# Patient Record
Sex: FEMALE | Race: ASIAN | NOT HISPANIC OR LATINO | URBAN - METROPOLITAN AREA
[De-identification: names, ages, dates, MRNs, and addresses within clinical notes are randomized per-mention and may not be internally consistent; named-entity substitution may affect disease eponyms.]

---

## 2021-05-13 ENCOUNTER — OFFICE VISIT (OUTPATIENT)
Dept: URBAN - METROPOLITAN AREA CLINIC 35 | Facility: CLINIC | Age: 41
End: 2021-05-13

## 2021-05-13 VITALS
BODY MASS INDEX: 21.17 KG/M2 | SYSTOLIC BLOOD PRESSURE: 112 MMHG | HEIGHT: 64 IN | HEART RATE: 86 BPM | TEMPERATURE: 99 F | OXYGEN SATURATION: 99 % | DIASTOLIC BLOOD PRESSURE: 72 MMHG | WEIGHT: 124 LBS

## 2021-05-13 PROBLEM — 102614006 GENERALIZED ABDOMINAL PAIN: Status: ACTIVE | Noted: 2021-05-13

## 2021-05-13 PROBLEM — 79922009 EPIGASTRIC PAIN: Status: ACTIVE | Noted: 2021-05-13

## 2021-05-13 PROBLEM — 16331000 HEARTBURN: Status: ACTIVE | Noted: 2021-05-13

## 2021-05-13 PROBLEM — 61565001 CHOLESTEROLOSIS OF GALLBLADDER: Status: ACTIVE | Noted: 2021-05-13

## 2021-05-13 RX ORDER — LORATADINE 10 MG
1 PACKET MIXED WITH 8 OUNCES OF FLUID TABLET,DISINTEGRATING ORAL ONCE A DAY
Qty: 30 | Status: ACTIVE | COMMUNITY

## 2021-05-13 RX ORDER — OMEPRAZOLE 40 MG/1
1 CAPSULE 30 MINUTES BEFORE MORNING MEAL CAPSULE, DELAYED RELEASE ORAL ONCE A DAY
Qty: 30 | Refills: 2 | OUTPATIENT
Start: 2021-05-13

## 2021-05-13 RX ORDER — LEVOTHYROXINE SODIUM 0.07 MG/1
1 TABLET IN THE MORNING ON AN EMPTY STOMACH TABLET ORAL ONCE A DAY
Qty: 30 | Status: ACTIVE | COMMUNITY

## 2021-05-13 RX ORDER — DICYCLOMINE HYDROCHLORIDE 10 MG/1
1 TABLET AS NEEDED CAPSULE ORAL THREE TIMES A DAY
Qty: 30 TABLET | Refills: 2 | OUTPATIENT
Start: 2021-05-13

## 2021-05-13 RX ORDER — CHOLECALCIFEROL (VITAMIN D3) 50 MCG
1 TABLET TABLET ORAL ONCE A DAY
Qty: 30 | Status: ACTIVE | COMMUNITY

## 2021-05-13 RX ORDER — BISACODYL 5 MG
1 TABLET AS NEEDED TABLET, DELAYED RELEASE (ENTERIC COATED) ORAL ONCE A DAY
Qty: 30 | Status: ACTIVE | COMMUNITY

## 2021-05-13 RX ORDER — OMEPRAZOLE 20 MG/1
1 CAPSULE CAPSULE, DELAYED RELEASE ORAL ONCE A DAY
Qty: 90 CAPSULE | Status: ACTIVE | COMMUNITY
Start: 2021-05-13

## 2021-05-13 NOTE — EXAM-MIGRATED EXAMINATIONS
General Examination : Medical assistant was in room.;     GENERAL APPEARANCE: - alert, in no acute distress, well developed, well nourished;   ORAL CAVITY: - mucosa moist;   THROAT: - clear;   NECK/THYROID: - neck supple, full range of motion, no cervical lymphadenopathy, no thyroid nodules, no thyromegaly, trachea midline;   HEART: - no murmurs, regular rate and rhythm, S1, S2 normal;   LUNGS: - clear to auscultation bilaterally, good air movement, no wheezes, rales, rhonchi;   ABDOMEN: - bowel sounds present, no masses palpable, no organomegaly , no rebound tenderness, soft, nontender, nondistended;

## 2021-05-13 NOTE — HPI-MIGRATED HPI
Abdominal Pain :                   40 year old female patient presents for abdominal pain consult. Patient admits she has been experiencing symptoms since 3 weeks ago. She has seen Dr roberto wick within the past 3 weeks . He ordered HIDA scan and RUQ Abd ultrasound with normal results.   Patient describes symptoms as RUQ and epigastric pain. Patient admits symptoms are intermittent throughout the day.   Patient denies nausea or vomiting.  Patient denies EGD in the past.    ;

## 2021-06-03 ENCOUNTER — OFFICE VISIT (OUTPATIENT)
Dept: URBAN - METROPOLITAN AREA CLINIC 35 | Facility: CLINIC | Age: 41
End: 2021-06-03

## 2021-06-03 VITALS
HEART RATE: 96 BPM | BODY MASS INDEX: 21.17 KG/M2 | HEIGHT: 64 IN | WEIGHT: 124 LBS | SYSTOLIC BLOOD PRESSURE: 120 MMHG | DIASTOLIC BLOOD PRESSURE: 50 MMHG | OXYGEN SATURATION: 98 %

## 2021-06-03 RX ORDER — OMEPRAZOLE 20 MG/1
1 CAPSULE CAPSULE, DELAYED RELEASE ORAL ONCE A DAY
Qty: 90 CAPSULE | Status: ACTIVE | COMMUNITY
Start: 2021-05-13

## 2021-06-03 RX ORDER — LEVOTHYROXINE SODIUM 0.07 MG/1
1 TABLET IN THE MORNING ON AN EMPTY STOMACH TABLET ORAL ONCE A DAY
Qty: 30 | Status: ACTIVE | COMMUNITY

## 2021-06-03 RX ORDER — BISACODYL 5 MG
1 TABLET AS NEEDED TABLET, DELAYED RELEASE (ENTERIC COATED) ORAL ONCE A DAY
Qty: 30 | Status: ACTIVE | COMMUNITY

## 2021-06-03 RX ORDER — DICYCLOMINE HYDROCHLORIDE 10 MG/1
1 TABLET AS NEEDED CAPSULE ORAL THREE TIMES A DAY
Qty: 30 TABLET | Refills: 2 | Status: ACTIVE | COMMUNITY
Start: 2021-05-13

## 2021-06-03 RX ORDER — OMEPRAZOLE 40 MG/1
1 CAPSULE 30 MINUTES BEFORE MORNING MEAL CAPSULE, DELAYED RELEASE ORAL ONCE A DAY
Qty: 30 | Refills: 2 | Status: ACTIVE | COMMUNITY
Start: 2021-05-13

## 2021-06-03 RX ORDER — LORATADINE 10 MG
1 PACKET MIXED WITH 8 OUNCES OF FLUID TABLET,DISINTEGRATING ORAL ONCE A DAY
Qty: 30 | Status: ACTIVE | COMMUNITY

## 2021-06-03 RX ORDER — OMEPRAZOLE 20 MG/1
1 CAPSULE 30 MIN BEFORE MEAL CAPSULE, DELAYED RELEASE ORAL BID
Qty: 60 | Refills: 2 | OUTPATIENT
Start: 2021-06-03

## 2021-06-03 RX ORDER — CHOLECALCIFEROL (VITAMIN D3) 50 MCG
1 TABLET TABLET ORAL ONCE A DAY
Qty: 30 | Status: ACTIVE | COMMUNITY

## 2021-06-03 NOTE — EXAM-MIGRATED EXAMINATIONS
GENERAL APPEARANCE: - alert, in no acute distress, well developed, well nourished;   ORAL CAVITY: - mucosa moist;   THROAT: - clear;   NECK/THYROID: - neck supple, full range of motion, no cervical lymphadenopathy, no thyroid nodules, no thyromegaly, trachea midline;   HEART: - no murmurs, regular rate and rhythm, S1, S2 normal;   LUNGS: - clear to auscultation bilaterally, good air movement, no wheezes, rales, rhonchi;

## 2021-06-03 NOTE — HPI-MIGRATED HPI
Heartburn : 41 y/o female patient presents today to follow up of heartburn symptoms. Patient continues to experience heartburn and epigastric pain. She denies taking Omeprazole 40 mg any longer as she felt more heartburn.  Patient states that she has been taking Omeprazole 20 mg OTC with little relief. Patient also notes that she is no longer taking Dicyclomine 10 mg since she felt that it was not effective at all.   Patient denies nausea , vomiting, or changes in bowel habits.  ;   Abdominal Pain : 40 year-old female patient presents today for a 2 week follow up of epigastric pain. Patient denies improvement since last visit. She denies taking Omeprazole 40 mg any longer as she felt more heartburn.  Patient states that she has been taking Omeprazole 20 mg OTC with little relief. Patient also notes that she is no longer taking Dicyclomine 10 mg since she felt that it was not effective at all.   Patient denies nausea , vomiting, or changes in bowel habits.  Patient notes that stretching gives minor relief of symptoms.          Of last visit (05/13/2021) 40 year old female patient presents for abdominal pain consult. Patient admits she has been experiencing symptoms since 3 weeks ago. She has seen Dr roberto wick within the past 3 weeks . He ordered HIDA scan and RUQ Abd ultrasound with normal results.   Patient describes symptoms as RUQ and epigastric pain. Patient admits symptoms are intermittent throughout the day.   Patient denies nausea or vomiting.  Patient denies EGD in the past.;

## 2021-06-08 ENCOUNTER — OFFICE VISIT (OUTPATIENT)
Dept: URBAN - METROPOLITAN AREA SURGERY CENTER 8 | Facility: SURGERY CENTER | Age: 41
End: 2021-06-08

## 2021-06-24 ENCOUNTER — OFFICE VISIT (OUTPATIENT)
Dept: URBAN - METROPOLITAN AREA CLINIC 35 | Facility: CLINIC | Age: 41
End: 2021-06-24

## 2021-06-24 ENCOUNTER — DASHBOARD ENCOUNTERS (OUTPATIENT)
Age: 41
End: 2021-06-24

## 2021-06-24 VITALS
HEIGHT: 64 IN | BODY MASS INDEX: 21 KG/M2 | OXYGEN SATURATION: 99 % | SYSTOLIC BLOOD PRESSURE: 100 MMHG | HEART RATE: 97 BPM | WEIGHT: 123 LBS | DIASTOLIC BLOOD PRESSURE: 74 MMHG

## 2021-06-24 RX ORDER — LORATADINE 10 MG
1 PACKET MIXED WITH 8 OUNCES OF FLUID TABLET,DISINTEGRATING ORAL ONCE A DAY
Qty: 30 | Status: ACTIVE | COMMUNITY

## 2021-06-24 RX ORDER — OMEPRAZOLE 40 MG/1
1 CAPSULE 30 MINUTES BEFORE MORNING MEAL CAPSULE, DELAYED RELEASE ORAL ONCE A DAY
Qty: 30 | Refills: 2 | Status: DISCONTINUED | COMMUNITY
Start: 2021-05-13

## 2021-06-24 RX ORDER — BISACODYL 5 MG
1 TABLET AS NEEDED TABLET, DELAYED RELEASE (ENTERIC COATED) ORAL ONCE A DAY
Qty: 30 | Status: ACTIVE | COMMUNITY

## 2021-06-24 RX ORDER — OMEPRAZOLE 20 MG/1
1 CAPSULE 30 MIN BEFORE MEAL CAPSULE, DELAYED RELEASE ORAL BID
Qty: 60 | Refills: 2 | Status: ACTIVE | COMMUNITY
Start: 2021-06-03

## 2021-06-24 RX ORDER — DICYCLOMINE HYDROCHLORIDE 10 MG/1
1 TABLET AS NEEDED CAPSULE ORAL THREE TIMES A DAY
Qty: 30 TABLET | Refills: 2 | Status: ACTIVE | COMMUNITY
Start: 2021-05-13

## 2021-06-24 RX ORDER — CHOLECALCIFEROL (VITAMIN D3) 50 MCG
1 TABLET TABLET ORAL ONCE A DAY
Qty: 30 | Status: ACTIVE | COMMUNITY

## 2021-06-24 RX ORDER — LEVOTHYROXINE SODIUM 0.07 MG/1
1 TABLET IN THE MORNING ON AN EMPTY STOMACH TABLET ORAL ONCE A DAY
Qty: 30 | Status: ACTIVE | COMMUNITY

## 2021-06-24 RX ORDER — OMEPRAZOLE 20 MG/1
1 CAPSULE CAPSULE, DELAYED RELEASE ORAL ONCE A DAY
Qty: 90 CAPSULE | Status: DISCONTINUED | COMMUNITY
Start: 2021-05-13

## 2021-06-24 NOTE — HPI-MIGRATED HPI
Heartburn : Patient admits heartburn is con trolled with Omeprazole 20mg daily . She denies nausea or vomiting    Of last visit (06/03/2021) 41 y/o female patient presents today to follow up of heartburn symptoms. Patient continues to experience heartburn and epigastric pain. She denies taking Omeprazole 40 mg any longer as she felt more heartburn.  Patient states that she has been taking Omeprazole 20 mg OTC with little relief. Patient also notes that she is no longer taking Dicyclomine 10 mg since she felt that it was not effective at all.   Patient denies nausea , vomiting, or changes in bowel habits.;   Abdominal Pain : Patient presents for follow up after EGD . Patient admits she is still experiencing RUQ pain. She admitstaking dicyclomine as needed . She denies complications after procedure .     Of last visit (06/03/2021) 40 year-old female patient presents today for a 2 week follow up of epigastric pain. Patient denies improvement since last visit. She denies taking Omeprazole 40 mg any longer as she felt more heartburn.  Patient states that she has been taking Omeprazole 20 mg OTC with little relief. Patient also notes that she is no longer taking Dicyclomine 10 mg since she felt that it was not effective at all.   Patient denies nausea , vomiting, or changes in bowel habits.  Patient notes that stretching gives minor relief of symptoms.          Of last visit (05/13/2021) 40 year old female patient presents for abdominal pain consult. Patient admits she has been experiencing symptoms since 3 weeks ago. She has seen Dr roberto wick within the past 3 weeks . He ordered HIDA scan and RUQ Abd ultrasound with normal results.   Patient describes symptoms as RUQ and epigastric pain. Patient admits symptoms are intermittent throughout the day.   Patient denies nausea or vomiting.  Patient denies EGD in the past.;

## 2025-04-04 ENCOUNTER — OFFICE VISIT (OUTPATIENT)
Dept: URBAN - METROPOLITAN AREA CLINIC 35 | Facility: CLINIC | Age: 45
End: 2025-04-04
Payer: COMMERCIAL

## 2025-04-04 ENCOUNTER — OFFICE VISIT (OUTPATIENT)
Dept: URBAN - METROPOLITAN AREA CLINIC 35 | Facility: CLINIC | Age: 45
End: 2025-04-04

## 2025-04-04 DIAGNOSIS — R19.4 CHANGE IN BOWEL HABITS: ICD-10-CM

## 2025-04-04 DIAGNOSIS — K82.4 CHOLESTEROLOSIS OF GALLBLADDER: ICD-10-CM

## 2025-04-04 DIAGNOSIS — R10.30 LOWER ABDOMINAL PAIN: ICD-10-CM

## 2025-04-04 DIAGNOSIS — K58.1 IRRITABLE BOWEL SYNDROME WITH CONSTIPATION: ICD-10-CM

## 2025-04-04 DIAGNOSIS — K21.9 CHRONIC GERD: ICD-10-CM

## 2025-04-04 PROBLEM — 235595009: Status: ACTIVE | Noted: 2025-04-04

## 2025-04-04 PROBLEM — 54586004: Status: ACTIVE | Noted: 2025-04-04

## 2025-04-04 PROBLEM — 440630006: Status: ACTIVE | Noted: 2025-04-04

## 2025-04-04 PROBLEM — 88111009: Status: ACTIVE | Noted: 2025-04-04

## 2025-04-04 PROCEDURE — 99214 OFFICE O/P EST MOD 30 MIN: CPT | Performed by: INTERNAL MEDICINE

## 2025-04-04 RX ORDER — LORATADINE 10 MG
1 PACKET MIXED WITH 8 OUNCES OF FLUID TABLET,DISINTEGRATING ORAL ONCE A DAY
Qty: 30 | Status: ON HOLD | COMMUNITY

## 2025-04-04 RX ORDER — TENAPANOR HYDROCHLORIDE 53.2 MG/1
1 TABLET IMMEDIATELY BEFORE MEALS TABLET ORAL TWICE A DAY
Qty: 60 | Refills: 3 | OUTPATIENT
Start: 2025-04-04

## 2025-04-04 RX ORDER — BISACODYL 5 MG
1 TABLET AS NEEDED TABLET, DELAYED RELEASE (ENTERIC COATED) ORAL ONCE A DAY
Qty: 30 | Status: ACTIVE | COMMUNITY

## 2025-04-04 RX ORDER — OMEPRAZOLE 20 MG/1
1 CAPSULE 30 MIN BEFORE MEAL CAPSULE, DELAYED RELEASE ORAL BID
Qty: 60 | Refills: 2 | Status: ON HOLD | COMMUNITY
Start: 2021-06-03

## 2025-04-04 RX ORDER — OMEPRAZOLE 20 MG/1
1 TABLET 1/2 TO 1 HOUR BEFORE MORNING MEAL TABLET, DELAYED RELEASE ORAL ONCE A DAY
Qty: 30 | Refills: 3 | OUTPATIENT
Start: 2025-04-04

## 2025-04-04 RX ORDER — DICYCLOMINE HYDROCHLORIDE 10 MG/1
1 TABLET AS NEEDED CAPSULE ORAL THREE TIMES A DAY
Qty: 30 TABLET | Refills: 2 | Status: ON HOLD | COMMUNITY
Start: 2021-05-13

## 2025-04-04 RX ORDER — CHOLECALCIFEROL (VITAMIN D3) 50 MCG
1 TABLET TABLET ORAL ONCE A DAY
Qty: 30 | Status: ACTIVE | COMMUNITY

## 2025-04-04 RX ORDER — LEVOTHYROXINE SODIUM 0.07 MG/1
1 TABLET IN THE MORNING ON AN EMPTY STOMACH TABLET ORAL ONCE A DAY
Qty: 30 | Status: ACTIVE | COMMUNITY

## 2025-04-04 NOTE — HPI-CONSTIPATION
44 year old female patient presents for constipation consult. She states symptoms have been present for the last 10 years. She states that she has been taking 2 ducolax tabs daily in order to pass stool. She states that her diet consists of a good amount of fiber, but states without the dulcolax, she cannot pass stool at all. She states she does not get the urge to go and if she trys to attempt, stool creates pressure on rectum and she would need to strain to no avail in passing stool. With the ducolax, she admits 1 bowel movement a day with stools initially hard and then soften. Denies rectal bleeding /melena. She admits having a hemorrhoid last month. She used Prep H cream for two weeks which resolved symptoms. Denies having recent abdominal Xray. Admits last labs in 10/2024, noted below.  OUTSIDE LABS (10/11/2024) CBC - WNL CMP - WNL

## 2025-05-01 ENCOUNTER — OFFICE VISIT (OUTPATIENT)
Dept: URBAN - METROPOLITAN AREA CLINIC 35 | Facility: CLINIC | Age: 45
End: 2025-05-01

## 2025-06-09 ENCOUNTER — OFFICE VISIT (OUTPATIENT)
Dept: URBAN - METROPOLITAN AREA CLINIC 35 | Facility: CLINIC | Age: 45
End: 2025-06-09
Payer: COMMERCIAL

## 2025-06-09 DIAGNOSIS — K82.4 CHOLESTEROLOSIS OF GALLBLADDER: ICD-10-CM

## 2025-06-09 DIAGNOSIS — K62.89 RECTAL PAIN: ICD-10-CM

## 2025-06-09 DIAGNOSIS — K58.1 IRRITABLE BOWEL SYNDROME WITH CONSTIPATION: ICD-10-CM

## 2025-06-09 DIAGNOSIS — K21.9 CHRONIC GERD: ICD-10-CM

## 2025-06-09 DIAGNOSIS — R19.4 CHANGE IN BOWEL HABITS: ICD-10-CM

## 2025-06-09 DIAGNOSIS — K64.4 HEMORRHOIDS, EXTERNAL: ICD-10-CM

## 2025-06-09 DIAGNOSIS — K64.8 INTERNAL HEMORRHOIDS: ICD-10-CM

## 2025-06-09 DIAGNOSIS — K60.2 ANAL FISSURE: ICD-10-CM

## 2025-06-09 PROBLEM — 23913003: Status: ACTIVE | Noted: 2025-06-09

## 2025-06-09 PROCEDURE — 99214 OFFICE O/P EST MOD 30 MIN: CPT | Performed by: PHYSICIAN ASSISTANT

## 2025-06-09 RX ORDER — LEVOTHYROXINE SODIUM 0.07 MG/1
1 TABLET IN THE MORNING ON AN EMPTY STOMACH TABLET ORAL ONCE A DAY
Qty: 30 | Status: ACTIVE | COMMUNITY

## 2025-06-09 RX ORDER — OMEPRAZOLE 20 MG/1
1 TABLET 1/2 TO 1 HOUR BEFORE MORNING MEAL TABLET, DELAYED RELEASE ORAL ONCE A DAY
Qty: 30 | Refills: 3 | Status: ACTIVE | COMMUNITY
Start: 2025-04-04

## 2025-06-09 RX ORDER — CHOLECALCIFEROL (VITAMIN D3) 50 MCG
1 TABLET TABLET ORAL ONCE A DAY
Qty: 30 | Status: ACTIVE | COMMUNITY

## 2025-06-09 RX ORDER — DOCUSATE SODIUM 100 MG/1
1 CAPSULE AS NEEDED CAPSULE ORAL TWICE A DAY
Qty: 60 CAPSULE | Refills: 1 | OUTPATIENT
Start: 2025-06-09 | End: 2025-08-08

## 2025-06-09 RX ORDER — OMEPRAZOLE 20 MG/1
1 CAPSULE 30 MIN BEFORE MEAL CAPSULE, DELAYED RELEASE ORAL BID
Qty: 60 | Refills: 2 | Status: ON HOLD | COMMUNITY
Start: 2021-06-03

## 2025-06-09 RX ORDER — LORATADINE 10 MG
1 PACKET MIXED WITH 8 OUNCES OF FLUID TABLET,DISINTEGRATING ORAL ONCE A DAY
Qty: 30 | Status: ON HOLD | COMMUNITY

## 2025-06-09 RX ORDER — NIFEDIPINE
PEA SIZE AMOUNT POWDER (GRAM) MISCELLANEOUS
Qty: 30 GRAMS | Refills: 1 | OUTPATIENT
Start: 2025-06-09 | End: 2025-08-08

## 2025-06-09 RX ORDER — BISACODYL 5 MG
1 TABLET AS NEEDED TABLET, DELAYED RELEASE (ENTERIC COATED) ORAL ONCE A DAY
Qty: 30 | Status: ACTIVE | COMMUNITY

## 2025-06-09 RX ORDER — DICYCLOMINE HYDROCHLORIDE 10 MG/1
1 TABLET AS NEEDED CAPSULE ORAL THREE TIMES A DAY
Qty: 30 TABLET | Refills: 2 | Status: ON HOLD | COMMUNITY
Start: 2021-05-13

## 2025-06-09 RX ORDER — LINACLOTIDE 290 UG/1
1 CAPSULE AT LEAST 30 MINUTES BEFORE THE FIRST MEAL OF THE DAY ON AN EMPTY STOMACH CAPSULE, GELATIN COATED ORAL ONCE A DAY
Qty: 30 | Refills: 3 | OUTPATIENT
Start: 2025-06-09 | End: 2025-10-07

## 2025-06-09 RX ORDER — OMEPRAZOLE 20 MG/1
1 TABLET 1/2 TO 1 HOUR BEFORE MORNING MEAL TABLET, DELAYED RELEASE ORAL ONCE A DAY
Qty: 30 | Refills: 3 | OUTPATIENT
Start: 2025-06-09

## 2025-06-09 RX ORDER — TENAPANOR HYDROCHLORIDE 53.2 MG/1
1 TABLET IMMEDIATELY BEFORE MEALS TABLET ORAL TWICE A DAY
Qty: 60 | Refills: 3 | Status: DISCONTINUED | COMMUNITY
Start: 2025-04-04

## 2025-06-09 NOTE — HPI-RECTAL PAIN
44 year old female patient presents today for rectal pain due to what she believes is a possible hemorrhoid or fissure. She admits to severe pain that has been present for 4 days. She admits she has been using preparation h cream without relief. She denies any rectal bleeding or blood when wiping. She also admits to a burning sensation in the inside of her rectum.  Provider notes Four to five days ago she started having rectal pain.  She also notes a burning sensation in her rectum, but the burning will last all day, not just during a bowel movement. She got some Preparation H cream, but it's still hurting in her rectum.  Patient knows she has hemorrhoids, and two months ago she used the same cream and it helped resolve the hemorrhoidal symptoms.  No bleeding.  She hurts when she passes stool but pain lingers and persists after a bowel movement as well.  No history of anal fissure.

## 2025-06-09 NOTE — HPI-CONSTIPATION
She admits continued constipation. She admits taking Ibsrela Tablet, 50 MG prn. She admits she will take Dulcolax most of the time. She currently admits 1 bowel movement per day, with strain. Stools are formed and initally hard.   Provider notes Pt took Ibsrela for 3-4 days then stopped because it was not helping.  She was taking it as a twice day dosing. She can only have a bowel movement if she takes Dulcolax. She can have a bowel movement daily, but agan only with use of Dulcolax.  Patient has had constipation issues more than 10 years.  She admits to history of hemorrhoids.  She is not sure what happened to change her bowels.  She has never had a colonoscopy.  Last visit (4/4/2025): 44 year old female patient presents for constipation consult. She states symptoms have been present for the last 10 years. She states that she has been taking 2 ducolax tabs daily in order to pass stool. She states that her diet consists of a good amount of fiber, but states without the dulcolax, she cannot pass stool at all. She states she does not get the urge to go and if she trys to attempt, stool creates pressure on rectum and she would need to strain to no avail in passing stool. With the ducolax, she admits 1 bowel movement a day with stools initially hard and then soften. Denies rectal bleeding /melena. She admits having a hemorrhoid last month. She used Prep H cream for two weeks which resolved symptoms. Denies having recent abdominal Xray. Admits last labs in 10/2024, noted below.  OUTSIDE LABS (10/11/2024) CBC - WNL CMP - WNL

## 2025-06-12 ENCOUNTER — OFFICE VISIT (OUTPATIENT)
Dept: URBAN - METROPOLITAN AREA CLINIC 35 | Facility: CLINIC | Age: 45
End: 2025-06-12

## 2025-06-24 ENCOUNTER — OFFICE VISIT (OUTPATIENT)
Dept: URBAN - METROPOLITAN AREA CLINIC 35 | Facility: CLINIC | Age: 45
End: 2025-06-24
Payer: COMMERCIAL

## 2025-06-24 DIAGNOSIS — K64.8 INTERNAL HEMORRHOIDS: ICD-10-CM

## 2025-06-24 DIAGNOSIS — K64.4 HEMORRHOIDS, EXTERNAL: ICD-10-CM

## 2025-06-24 DIAGNOSIS — K60.2 ANAL FISSURE: ICD-10-CM

## 2025-06-24 DIAGNOSIS — K21.9 CHRONIC GERD: ICD-10-CM

## 2025-06-24 DIAGNOSIS — K62.89 RECTAL PAIN: ICD-10-CM

## 2025-06-24 DIAGNOSIS — K58.1 IRRITABLE BOWEL SYNDROME WITH CONSTIPATION: ICD-10-CM

## 2025-06-24 DIAGNOSIS — R19.4 CHANGE IN BOWEL HABITS: ICD-10-CM

## 2025-06-24 DIAGNOSIS — K82.4 CHOLESTEROLOSIS OF GALLBLADDER: ICD-10-CM

## 2025-06-24 PROCEDURE — 99214 OFFICE O/P EST MOD 30 MIN: CPT | Performed by: PHYSICIAN ASSISTANT

## 2025-06-24 RX ORDER — OMEPRAZOLE 20 MG/1
1 TABLET 1/2 TO 1 HOUR BEFORE MORNING MEAL TABLET, DELAYED RELEASE ORAL ONCE A DAY
Qty: 30 | Refills: 3 | Status: ACTIVE | COMMUNITY
Start: 2025-06-09

## 2025-06-24 RX ORDER — DOCUSATE SODIUM 100 MG/1
1 CAPSULE AS NEEDED CAPSULE ORAL TWICE A DAY
Qty: 60 CAPSULE | Refills: 1 | Status: ON HOLD | COMMUNITY
Start: 2025-06-09 | End: 2025-08-08

## 2025-06-24 RX ORDER — LORATADINE 10 MG
1 PACKET MIXED WITH 8 OUNCES OF FLUID TABLET,DISINTEGRATING ORAL ONCE A DAY
Qty: 30 | Status: ON HOLD | COMMUNITY

## 2025-06-24 RX ORDER — NIFEDIPINE
PEA SIZE AMOUNT POWDER (GRAM) MISCELLANEOUS
Qty: 30 GRAMS | Refills: 1 | OUTPATIENT
Start: 2025-06-24 | End: 2025-08-23

## 2025-06-24 RX ORDER — NIFEDIPINE
PEA SIZE AMOUNT POWDER (GRAM) MISCELLANEOUS
Qty: 30 GRAMS | Refills: 1 | Status: ACTIVE | COMMUNITY
Start: 2025-06-09 | End: 2025-08-08

## 2025-06-24 RX ORDER — OMEPRAZOLE 20 MG/1
1 CAPSULE 30 MIN BEFORE MEAL CAPSULE, DELAYED RELEASE ORAL BID
Qty: 60 | Refills: 2 | Status: ON HOLD | COMMUNITY
Start: 2021-06-03

## 2025-06-24 RX ORDER — OMEPRAZOLE 20 MG/1
1 TABLET 1/2 TO 1 HOUR BEFORE MORNING MEAL TABLET, DELAYED RELEASE ORAL ONCE A DAY
Qty: 30 | Refills: 3 | OUTPATIENT
Start: 2025-06-24

## 2025-06-24 RX ORDER — LINACLOTIDE 290 UG/1
1 CAPSULE AT LEAST 30 MINUTES BEFORE THE FIRST MEAL OF THE DAY ON AN EMPTY STOMACH CAPSULE, GELATIN COATED ORAL ONCE A DAY
Qty: 30 | Refills: 3 | OUTPATIENT
Start: 2025-06-24 | End: 2025-10-22

## 2025-06-24 RX ORDER — DICYCLOMINE HYDROCHLORIDE 10 MG/1
1 TABLET AS NEEDED CAPSULE ORAL THREE TIMES A DAY
Qty: 30 TABLET | Refills: 2 | Status: ON HOLD | COMMUNITY
Start: 2021-05-13

## 2025-06-24 RX ORDER — LEVOTHYROXINE SODIUM 0.07 MG/1
1 TABLET IN THE MORNING ON AN EMPTY STOMACH TABLET ORAL ONCE A DAY
Qty: 30 | Status: ACTIVE | COMMUNITY

## 2025-06-24 RX ORDER — DOCUSATE SODIUM 100 MG/1
1 CAPSULE AS NEEDED CAPSULE ORAL TWICE A DAY
Qty: 60 CAPSULE | Refills: 1 | OUTPATIENT
Start: 2025-06-24 | End: 2025-08-23

## 2025-06-24 RX ORDER — LINACLOTIDE 290 UG/1
1 CAPSULE AT LEAST 30 MINUTES BEFORE THE FIRST MEAL OF THE DAY ON AN EMPTY STOMACH CAPSULE, GELATIN COATED ORAL ONCE A DAY
Qty: 30 | Refills: 3 | Status: ON HOLD | COMMUNITY
Start: 2025-06-09 | End: 2025-10-07

## 2025-06-24 RX ORDER — BISACODYL 5 MG
1 TABLET AS NEEDED TABLET, DELAYED RELEASE (ENTERIC COATED) ORAL ONCE A DAY
Qty: 30 | Status: ACTIVE | COMMUNITY

## 2025-06-24 RX ORDER — CHOLECALCIFEROL (VITAMIN D3) 50 MCG
1 TABLET TABLET ORAL ONCE A DAY
Qty: 30 | Status: ACTIVE | COMMUNITY

## 2025-06-24 NOTE — HPI-RECTAL PAIN
Patient presents today for follow-up of rectal pain. She admits using Nifedipine 0.3% with Lidocaine 5% and aloe ointment, applying a pea-sized amount rectally three times daily as directed. She admits relief with this medication. She admits experiencing burning, itching, pressure, or bleeding with bowel movements. She admits pain is more tolerable now but it is still present. She denies any rectal discharge. She admits swelling and visible hemorrhoids. She admits pain at rest versus only with defecation.   Provider notes Pt has pain only with defection and even with sitting she will still have some rectal pain. She has been using Nifedipne ointment.   Last visit (6/9/2025): 44 year old female patient presents today for rectal pain due to what she believes is a possible hemorrhoid or fissure. She admits to severe pain that has been present for 4 days. She admits she has been using preparation h cream without relief. She denies any rectal bleeding or blood when wiping. She also admits to a burning sensation in the inside of her rectum.  Provider notes Four to five days ago she started having rectal pain.  She also notes a burning sensation in her rectum, but the burning will last all day, not just during a bowel movement. She got some Preparation H cream, but it's still hurting in her rectum.  Patient knows she has hemorrhoids, and two months ago she used the same cream and it helped resolve the hemorrhoidal symptoms.  No bleeding.  She hurts when she passes stool but pain lingers and persists after a bowel movement as well.  No history of anal fissure.

## 2025-06-24 NOTE — HPI-CONSTIPATION
She denies taking Linzess 290 mcg daily and denies taking Colace 100 mg as needed. She admits taking Miralax one capful a day and two Dulcolax per day. She currently reports having 1 bowel movement per day, with occasional straining. Stools are described as hard initially. She denies any blood, mucus, or melena in the stool. She also admits incomplete evacuation.  Provider notes Pt feels she has dependence on Dulcolax and so has been continuing that.  She does admit to taking Miralax daily, and it helps but makes her bloated.  She admits stools will be [place] scale #1 or #2 unless she uses Dulcolax. She denies any blood in stool.  She does not feel she's completely evacuating when she has a bowel movement.  No associated abdominal pain.   Last visit (6/9/2025): She admits continued constipation. She admits taking Ibsrela Tablet, 50 MG prn. She admits she will take Dulcolax most of the time. She currently admits 1 bowel movement per day, with strain. Stools are formed and initally hard.   Provider notes Pt took Ibsrela for 3-4 days then stopped because it was not helping.  She was taking it as a twice day dosing. She can only have a bowel movement if she takes Dulcolax. She can have a bowel movement daily, but agan only with use of Dulcolax.  Patient has had constipation issues more than 10 years.  She admits to history of hemorrhoids.  She is not sure what happened to change her bowels.  She has never had a colonoscopy.  Last visit (4/4/2025): 44 year old female patient presents for constipation consult. She states symptoms have been present for the last 10 years. She states that she has been taking 2 ducolax tabs daily in order to pass stool. She states that her diet consists of a good amount of fiber, but states without the dulcolax, she cannot pass stool at all. She states she does not get the urge to go and if she trys to attempt, stool creates pressure on rectum and she would need to strain to no avail in passing stool. With the ducolax, she admits 1 bowel movement a day with stools initially hard and then soften. Denies rectal bleeding /melena. She admits having a hemorrhoid last month. She used Prep H cream for two weeks which resolved symptoms. Denies having recent abdominal Xray. Admits last labs in 10/2024, noted below.  OUTSIDE LABS (10/11/2024) CBC - WNL CMP - WNL

## 2025-07-18 ENCOUNTER — OFFICE VISIT (OUTPATIENT)
Dept: URBAN - METROPOLITAN AREA CLINIC 35 | Facility: CLINIC | Age: 45
End: 2025-07-18

## 2025-07-18 NOTE — HPI-CONSTIPATION
She denies taking Linzess 290 mcg daily and denies taking Colace 100 mg as needed. She admits taking Miralax one capful a day and two Dulcolax per day. She currently reports having 1 bowel movement per day, with occasional straining. Stools are described as hard initially. She denies any blood, mucus, or melena in the stool. She also admits incomplete evacuation.  Provider notes Pt feels she has dependence on Dulcolax and so has been continuing that.  She does admit to taking Miralax daily, and it helps but makes her bloated.  She admits stools will be [place] scale #1 or #2 unless she uses Dulcolax. She denies any blood in stool.  She does not feel she's completely evacuating when she has a bowel movement.  No associated abdominal pain.   Last visit (6/9/2025): She admits continued constipation. She admits taking Ibsrela Tablet, 50 MG prn. She admits she will take Dulcolax most of the time. She currently admits 1 bowel movement per day, with strain. Stools are formed and initally hard.   Provider notes Pt took Ibsrela for 3-4 days then stopped because it was not helping.  She was taking it as a twice day dosing. She can only have a bowel movement if she takes Dulcolax. She can have a bowel movement daily, but agan only with use of Dulcolax.  Patient has had constipation issues more than 10 years.  She admits to history of hemorrhoids.  She is not sure what happened to change her bowels.  She has never had a colonoscopy.  Last visit (4/4/2025): 44 year old female patient presents for constipation consult. She states symptoms have been present for the last 10 years. She states that she has been taking 2 ducolax tabs daily in order to pass stool. She states that her diet consists of a good amount of fiber, but states without the dulcolax, she cannot pass stool at all. She states she does not get the urge to go and if she trys to attempt, stool creates pressure on rectum and she would need to strain to no avail in passing stool. With the ducolax, she admits 1 bowel movement a day with stools initially hard and then soften. Denies rectal bleeding /melena. She admits having a hemorrhoid last month. She used Prep H cream for two weeks which resolved symptoms. Denies having recent abdominal Xray. Admits last labs in 10/2024, noted below.  OUTSIDE LABS (10/11/2024) CBC - WNL CMP - WNL